# Patient Record
Sex: MALE | Race: WHITE | NOT HISPANIC OR LATINO | Employment: FULL TIME | URBAN - METROPOLITAN AREA
[De-identification: names, ages, dates, MRNs, and addresses within clinical notes are randomized per-mention and may not be internally consistent; named-entity substitution may affect disease eponyms.]

---

## 2022-01-27 ENCOUNTER — OFFICE VISIT (OUTPATIENT)
Dept: FAMILY MEDICINE | Facility: CLINIC | Age: 32
End: 2022-01-27
Payer: OTHER GOVERNMENT

## 2022-01-27 VITALS
HEART RATE: 99 BPM | SYSTOLIC BLOOD PRESSURE: 100 MMHG | DIASTOLIC BLOOD PRESSURE: 60 MMHG | HEIGHT: 69 IN | TEMPERATURE: 100 F | WEIGHT: 156.31 LBS | BODY MASS INDEX: 23.15 KG/M2 | OXYGEN SATURATION: 96 %

## 2022-01-27 DIAGNOSIS — T50.905A DRESS SYNDROME: ICD-10-CM

## 2022-01-27 DIAGNOSIS — E86.0 DEHYDRATION: ICD-10-CM

## 2022-01-27 DIAGNOSIS — R05.9 COUGH: ICD-10-CM

## 2022-01-27 DIAGNOSIS — D72.12 DRESS SYNDROME: ICD-10-CM

## 2022-01-27 DIAGNOSIS — K12.30 MUCOSITIS ORAL: Primary | ICD-10-CM

## 2022-01-27 DIAGNOSIS — I95.9 HYPOTENSION, UNSPECIFIED HYPOTENSION TYPE: ICD-10-CM

## 2022-01-27 DIAGNOSIS — R00.0 TACHYCARDIA: ICD-10-CM

## 2022-01-27 PROBLEM — H17.822 PERIPHERAL OPACITY OF CORNEA, LEFT EYE: Status: ACTIVE | Noted: 2022-01-27

## 2022-01-27 PROBLEM — H52.209 ASTIGMATISM: Status: ACTIVE | Noted: 2022-01-27

## 2022-01-27 PROCEDURE — 99205 OFFICE O/P NEW HI 60 MIN: CPT | Mod: S$PBB,,, | Performed by: FAMILY MEDICINE

## 2022-01-27 PROCEDURE — 99999 PR PBB SHADOW E&M-NEW PATIENT-LVL V: CPT | Mod: PBBFAC,,, | Performed by: FAMILY MEDICINE

## 2022-01-27 PROCEDURE — 99205 OFFICE O/P NEW HI 60 MIN: CPT | Mod: PBBFAC,PO | Performed by: FAMILY MEDICINE

## 2022-01-27 PROCEDURE — 99999 PR PBB SHADOW E&M-NEW PATIENT-LVL V: ICD-10-PCS | Mod: PBBFAC,,, | Performed by: FAMILY MEDICINE

## 2022-01-27 PROCEDURE — 99205 PR OFFICE/OUTPT VISIT, NEW, LEVL V, 60-74 MIN: ICD-10-PCS | Mod: S$PBB,,, | Performed by: FAMILY MEDICINE

## 2022-01-27 RX ORDER — CHOLECALCIFEROL (VITAMIN D3) 25 MCG
1000 TABLET ORAL DAILY
COMMUNITY
End: 2022-10-24

## 2022-01-27 RX ORDER — HYDROCODONE POLISTIREX AND CHLORPHENIRAMINE POLISTIREX 10; 8 MG/5ML; MG/5ML
5 SUSPENSION, EXTENDED RELEASE ORAL EVERY 12 HOURS PRN
Qty: 120 ML | Refills: 0 | Status: SHIPPED | OUTPATIENT
Start: 2022-01-27 | End: 2022-02-17

## 2022-01-27 RX ORDER — SODIUM CHLORIDE 9 MG/ML
INJECTION, SOLUTION INTRAVENOUS
Status: DISCONTINUED | OUTPATIENT
Start: 2022-01-27 | End: 2022-01-27

## 2022-01-27 RX ORDER — SODIUM CHLORIDE 9 MG/ML
INJECTION, SOLUTION INTRAVENOUS ONCE
Status: DISCONTINUED | OUTPATIENT
Start: 2022-01-27 | End: 2022-01-27

## 2022-01-27 RX ORDER — PREDNISOLONE 15 MG/5ML
SOLUTION ORAL
COMMUNITY
Start: 2022-01-22 | End: 2022-02-03 | Stop reason: SDUPTHER

## 2022-01-27 RX ORDER — FAMOTIDINE 20 MG/1
20 TABLET, FILM COATED ORAL 2 TIMES DAILY
COMMUNITY
Start: 2022-01-22 | End: 2022-03-25

## 2022-01-27 NOTE — PROGRESS NOTES
Subjective:       Patient ID: Augustine Collins is a 31 y.o. male.    Chief Complaint: Establish Care (Went to Er on 01/09/22 wants to follow up with care cough ), Cough, Anorexia, and Dizziness    Here today to establish care with a new PCP.  Here today with his Sig. Other (who is an ER nurse).  One Jan 4th he started with a fever. (He is fully COVID vaccinated)  He was seen at Urgent Care on 1/6, tested negative for COVID and was prescribed Zithromax and Steroids.  No improvement. He continued to run fever and had bad headache.  He was recently seen a treated for Acute pharyngitis at the ER (1/9/22).  He tested neg for COVID.  He did not have a strep test.  He was treated with Amoxil.  No improvement. On 1/12 he went to [place] due to continued fever and mild cough.  He was tested for Mono, strep, flu and COVID.  He had a normal CXR.  He tested positive for strep, treated with Bicillin.  Had some improvement for a few hours and on 1/13, he developed a severe cough with post-tussive emesis.  On 1/14, he developed a rash, went back to ER at [place] and he developed jaundice with fever.  He had lab work done which showed elevated liver enzymes (, ), Sodium 128, Temp 104.5.  He had CXR and showed Pneumonia.  He was admitted for dehydration and pneumonia.  He became hypoxic, requiring up to 10 L of O2.  He saw ID.  He was transferred to [facility] and dx with DRESS syndrome from [facility].  His Abx were stopped and he was put on steroids.  He was found to have gallstones.  Bx of rash was consistent with drug reaction.  He was discharged from [facility] on 1/22/22 to home.  He had a f/u with Derm this week and had lab work which showed improvement.  He is c/o severe mouth and throat pain.  He has not been able to eat or drink.  He had magic mouthwash prescribed by his Derm.  It has not helped.    Review of Systems   Constitutional: Positive for fatigue and fever. Negative for appetite change.   HENT: Positive for sore  "throat and trouble swallowing.    Respiratory: Positive for cough. Negative for shortness of breath and wheezing.    Cardiovascular: Negative for chest pain and palpitations.   Gastrointestinal: Negative for constipation, diarrhea and vomiting.   Genitourinary: Negative for difficulty urinating, dysuria, frequency and hematuria.   Neurological: Negative for dizziness, syncope, weakness and headaches.   Psychiatric/Behavioral: Negative for agitation, behavioral problems and confusion.       Objective:      Vitals:    01/27/22 1049 01/27/22 1100 01/27/22 1243 01/27/22 1341   BP: (!) 72/68 (!) 84/62 108/78 100/60   Pulse: (!) 133  103 99   Temp:   (!) 100.6 °F (38.1 °C) 100.3 °F (37.9 °C)   SpO2: 96%  98% 96%   Weight: 70.9 kg (156 lb 4.9 oz)      Height: 5' 9" (1.753 m)         Physical Exam  Constitutional:       Appearance: He is ill-appearing.   HENT:      Mouth/Throat:      Mouth: Mucous membranes are dry. Oral lesions (multiple ulcerations to tongue, soft palate, posterior pharynx) present.      Tongue: Lesions present.      Palate: Lesions present.      Comments: Lips dry and cracked  Cardiovascular:      Rate and Rhythm: Regular rhythm. Tachycardia present.      Pulses: Normal pulses.   Pulmonary:      Effort: Pulmonary effort is normal. No respiratory distress.      Breath sounds: Normal breath sounds. No stridor. No wheezing or rales.   Musculoskeletal:         General: No swelling.   Skin:     General: Skin is warm.      Findings: No rash (desquamation present ).   Neurological:      General: No focal deficit present.      Mental Status: He is alert and oriented to person, place, and time.   Psychiatric:         Mood and Affect: Mood normal.         Behavior: Behavior normal.         Thought Content: Thought content normal.         Assessment:       1. Mucositis oral    2. DRESS syndrome    3. Dehydration    4. Tachycardia    5. Hypotension, unspecified hypotension type    6. Cough        Plan:     "   Mucositis oral  -     Discontinue: 0.9%  NaCl infusion  -     hydrocodone-chlorpheniramine (TUSSIONEX) 10-8 mg/5 mL suspension; Take 5 mLs by mouth every 12 (twelve) hours as needed for Cough.  Dispense: 120 mL; Refill: 0  -     Ambulatory referral/consult to ENT; Future; Expected date: 02/03/2022    DRESS syndrome    Dehydration  -     Discontinue: 0.9%  NaCl infusion  -     Discontinue: 0.9%  NaCl infusion  -     sodium chloride 0.9% bolus 2,000 mL    Tachycardia  -     Discontinue: 0.9%  NaCl infusion  -     sodium chloride 0.9% bolus 2,000 mL    Hypotension, unspecified hypotension type  -     Discontinue: 0.9%  NaCl infusion  -     sodium chloride 0.9% bolus 2,000 mL    Cough  -     hydrocodone-chlorpheniramine (TUSSIONEX) 10-8 mg/5 mL suspension; Take 5 mLs by mouth every 12 (twelve) hours as needed for Cough.  Dispense: 120 mL; Refill: 0    IV Fluids given in the clinic today.  Patient received 2 Liters.  Patient monitored.  Blood pressure improved and HR came down.  Stressed the importance of continued hydration.  Discussed small sips of water, Pedialyte pop, ice chips and using Pain medication to help.  Will start with Tussionex as he was told to avoid tylenol and it will help his cough as well.  If no improvement, Oncology suggested You could try liquid morphine.  application of morphine sulfate (0.2%, 2 mg/mL in water, 15 mL swish for two minutes and expectorate).      Will refer to ENT to see if there are any other options then his Magic Mouthwash to help with his ulcerations.      Medication List with Changes/Refills   New Medications    HYDROCODONE-CHLORPHENIRAMINE (TUSSIONEX) 10-8 MG/5 ML SUSPENSION    Take 5 mLs by mouth every 12 (twelve) hours as needed for Cough.   Current Medications    FAMOTIDINE (PEPCID) 20 MG TABLET    Take 20 mg by mouth 2 (two) times daily.    PREDNISOLONE (PRELONE) 15 MG/5 ML SYRUP    Take by mouth.    VITAMIN D (VITAMIN D3) 1000 UNITS TAB    Take 1,000 Units by mouth once  daily.

## 2022-01-27 NOTE — PROGRESS NOTES
[facility]    Subjective:      Patient: Augustine Collins Patient PCP: Zhang Zhou MD         :  1990     Sex:  male      MRN:  [id number]          Date of Visit: 2022      Chief Complaint: Oral mucositis    Patient ID: Augustine Collins is a 31 y.o. male who was referred by Dr. Zhang Zhou for for mucositis. Pt was seen by [facility] medicine 2022 for anorexia. Pt was noted to have multiple oral ulcers. Pt had been treated at ED 2022 for acute pharyngitis and treated with amoxil. Pt went to [place] 2022 and was tested for mono, strep, flu, and covid19. Pt had normal chest XR. Pt tested positive for strep and was treated with [place]. Pt developed rash on 2022 and went back to the ED at [place] and developed a rash with jaundice and fever. Pt had CXR that showed pneumonia. Pt was transferred to [facility] and dx with DRESS syndrome secondary to PCN. Pt abx were stopped and he was started on steroids. Skin biopsy consistent with drug reaction. Pt was prescribed magic mouthwash, which did not help his mouth soreness. Pt was given 2L NaCl IV fluids for dehydration. Pt was given tussionex suspension q12h for help with mucositis and cough. Pt is seen today in office and continues to take tussionex and is taking prelone syrup once a day and is currently taking an 80mg dose. Pt states his other DRESS symptoms have improved, but he is still having sever oral pain.    Review of Systems   Constitutional: Negative.    HENT: Positive for mouth sores and sore throat.    Eyes: Negative.    Respiratory: Positive for cough.    Cardiovascular: Negative.    Gastrointestinal: Negative.    Endocrine: Negative.    Genitourinary: Negative.    Musculoskeletal: Negative.    Skin: Positive for rash.   Allergic/Immunologic: Negative.    Neurological: Positive for dizziness and light-headedness.   Hematological: Negative.    Psychiatric/Behavioral: Negative.         Past Medical History  He has no past  medical history on file.    Family History  His family history is not on file.    No past surgical history on file.  Social History     Tobacco Use    Smoking status: Never Smoker    Smokeless tobacco: Never Used   Substance and Sexual Activity    Alcohol use: Not on file    Drug use: Not on file    Sexual activity: Not on file     Medications  He has a current medication list which includes the following prescription(s): famotidine, hydrocodone-chlorpheniramine, prednisolone, vitamin d, water Liqd 150 mL with MILK OF MAGNESIA 400 mg/5 mL Susp 400 mg, diphenhydrAMINE 12.5 mg/5 mL Elix 60 mg, nystatin 100,000 unit/mL Susp 500,000 Units, and dexamethasone.    Review of patient's allergies indicates:   Allergen Reactions    Pcn [penicillins]      All medications, allergies, and past history have been reviewed.    Objective:      Vitals:  Vitals - 1 value per visit 1/27/2022 1/28/2022 1/28/2022   SYSTOLIC 100 - -   DIASTOLIC 60 - -   Pulse 99 - -   Temp 100.3 - -   Resp - - -   SPO2 96 - -   Weight (lb) - - 156.09   Weight (kg) - - 70.8   Height - - 69   BMI (Calculated) - - 23   VISIT REPORT - - -   Pain Score  - 10 -       Body surface area is 1.86 meters squared.    Physical Exam  Constitutional:       General: He is not in acute distress.     Appearance: Normal appearance. He is not ill-appearing.   HENT:      Head: Normocephalic and atraumatic.      Right Ear: Tympanic membrane, ear canal and external ear normal.      Left Ear: Tympanic membrane, ear canal and external ear normal.      Nose: Nose normal.      Comments: Dry nasal mucosa bilaterally     Mouth/Throat:      Mouth: Mucous membranes are dry. Oral lesions (numerous lesions involving soft palate and buccal mucosa that apepar to be healing) present.      Pharynx: Uvula midline.      Comments: Lips dry with petroleum based product on it today in office.  Eyes:      General:         Right eye: No discharge.         Left eye: No discharge.       Extraocular Movements: Extraocular movements intact.      Conjunctiva/sclera: Conjunctivae normal.   Pulmonary:      Effort: Pulmonary effort is normal.   Skin:     Findings: Erythema (erythematous and dry) present.   Neurological:      General: No focal deficit present.      Mental Status: He is alert and oriented to person, place, and time. Mental status is at baseline.   Psychiatric:         Mood and Affect: Mood normal.         Behavior: Behavior normal.         Thought Content: Thought content normal.         Judgment: Judgment normal.       Labs:  No results found for: WBC, PLT, CREATININE, TSH, GLU, HGBA1C    All lab results and imaging results have been reviewed.    Assessment:        ICD-10-CM ICD-9-CM   1. DRESS syndrome  D72.12 693.0    T50.905A E947.9     288.3   2. Mouth ulcers  K12.1 528.9              Plan:     Pt advised that oral ulcers appear to be healing and to continue tussionex and prelone syrup as prescribed. Pt advised that he can use magic mouth wash between tussionex to try to get some relief of oral pain. Pt advised that he can swish steroid solution prior to swallowing to help with relief of pain. Pt advised to try to keep hydrated and to do liquid diet for now. He may progress diet as pain alleviates. I would like pt to maintain caloric intake. Pt advised that should he get dehydrated again, he may need IV fluids. I advised pt that supportive measures can be done for now and that he should slowly have relief and healing of oral lesions. I would like pt to follow up with hem/onc Dr. Laurie Huertas to see if there is any further blood workup that should be done for pt. Pt knows his DRESS syndrome is a hypersensitivity reaction that he had to penicillins. Pt will avoid use of penicillins. Pt advised to use nasal saline 4-6 times a day for nasal mucosa dryness. Pt may call with any questions or concerns.

## 2022-01-28 ENCOUNTER — TELEPHONE (OUTPATIENT)
Dept: HEMATOLOGY/ONCOLOGY | Facility: CLINIC | Age: 32
End: 2022-01-28

## 2022-01-28 ENCOUNTER — PATIENT MESSAGE (OUTPATIENT)
Dept: FAMILY MEDICINE | Facility: CLINIC | Age: 32
End: 2022-01-28

## 2022-01-28 ENCOUNTER — OFFICE VISIT (OUTPATIENT)
Dept: OTOLARYNGOLOGY | Facility: CLINIC | Age: 32
End: 2022-01-28
Payer: OTHER GOVERNMENT

## 2022-01-28 VITALS — WEIGHT: 156.06 LBS | BODY MASS INDEX: 23.12 KG/M2 | HEIGHT: 69 IN

## 2022-01-28 DIAGNOSIS — K12.30 MUCOSITIS ORAL: Primary | ICD-10-CM

## 2022-01-28 DIAGNOSIS — D72.12 DRESS SYNDROME: Primary | ICD-10-CM

## 2022-01-28 DIAGNOSIS — T50.905A DRESS SYNDROME: Primary | ICD-10-CM

## 2022-01-28 DIAGNOSIS — K12.1 MOUTH ULCERS: ICD-10-CM

## 2022-01-28 PROCEDURE — 99203 OFFICE O/P NEW LOW 30 MIN: CPT | Mod: S$PBB,,, | Performed by: PHYSICIAN ASSISTANT

## 2022-01-28 PROCEDURE — 99203 PR OFFICE/OUTPT VISIT, NEW, LEVL III, 30-44 MIN: ICD-10-PCS | Mod: S$PBB,,, | Performed by: PHYSICIAN ASSISTANT

## 2022-01-28 PROCEDURE — 99999 PR PBB SHADOW E&M-EST. PATIENT-LVL IV: ICD-10-PCS | Mod: PBBFAC,,, | Performed by: PHYSICIAN ASSISTANT

## 2022-01-28 PROCEDURE — 99214 OFFICE O/P EST MOD 30 MIN: CPT | Mod: PBBFAC,PO | Performed by: PHYSICIAN ASSISTANT

## 2022-01-28 PROCEDURE — 99999 PR PBB SHADOW E&M-EST. PATIENT-LVL IV: CPT | Mod: PBBFAC,,, | Performed by: PHYSICIAN ASSISTANT

## 2022-01-28 RX ORDER — DEXAMETHASONE 0.5 MG/5ML
1 SOLUTION ORAL EVERY 8 HOURS
Qty: 300 ML | Refills: 0 | Status: SHIPPED | OUTPATIENT
Start: 2022-01-28 | End: 2022-02-07

## 2022-01-28 NOTE — TELEPHONE ENCOUNTER
Received referral for dx of DRESS syndrome, mouth ulcers to Dr Huertas.  Pt will need to be seen sooner than next available benign hem appt.  Will clarify scheduling with Dr Ballard.

## 2022-01-28 NOTE — TELEPHONE ENCOUNTER
Returned call to pt.  Spoke with Inez, pt was on speaker phone with her.    Spoke with Dr Huertas and ok to schedule the pt for this Monday.  Confirmed appt date/time/location w/ pt and Ms Wiley.

## 2022-01-28 NOTE — TELEPHONE ENCOUNTER
----- Message from Maritza Evans sent at 1/28/2022 10:46 AM CST -----  Type: Needs Medical Advice  Who Called: Patient   Symptoms (please be specific):    How long has patient had these symptoms:    Pharmacy name and phone #:    Best Call Back Number: [phone]  Additional Information: Patient is requesting a call back to schedule an appt. from referral.

## 2022-01-28 NOTE — PATIENT INSTRUCTIONS
Continue oral intake. Do fluid diet for now. Please make sure to eat and keep well-hydrated.   Continue Tussionex twice daily and prelone syrup as prescribed.  Nasal saline 4-6 times a day to keep nasal mucosa moist.  Symptoms can take some time to slowly resolve. If severely dehydrated, follow up with urgent care or PCP for IV fluids.   Follow up with hematology.

## 2022-01-31 ENCOUNTER — OFFICE VISIT (OUTPATIENT)
Dept: HEMATOLOGY/ONCOLOGY | Facility: CLINIC | Age: 32
End: 2022-01-31
Payer: OTHER GOVERNMENT

## 2022-01-31 ENCOUNTER — LAB VISIT (OUTPATIENT)
Dept: LAB | Facility: HOSPITAL | Age: 32
End: 2022-01-31
Attending: INTERNAL MEDICINE
Payer: OTHER GOVERNMENT

## 2022-01-31 VITALS
DIASTOLIC BLOOD PRESSURE: 78 MMHG | HEART RATE: 106 BPM | TEMPERATURE: 98 F | WEIGHT: 155.44 LBS | OXYGEN SATURATION: 99 % | HEIGHT: 69 IN | SYSTOLIC BLOOD PRESSURE: 106 MMHG | BODY MASS INDEX: 23.02 KG/M2

## 2022-01-31 DIAGNOSIS — D72.12 DRESS SYNDROME: ICD-10-CM

## 2022-01-31 DIAGNOSIS — T50.905A DRESS SYNDROME: ICD-10-CM

## 2022-01-31 DIAGNOSIS — K12.1 MOUTH ULCERS: ICD-10-CM

## 2022-01-31 LAB
ALBUMIN SERPL BCP-MCNC: 3 G/DL (ref 3.5–5.2)
ALP SERPL-CCNC: 96 U/L (ref 55–135)
ALT SERPL W/O P-5'-P-CCNC: 66 U/L (ref 10–44)
ANION GAP SERPL CALC-SCNC: 9 MMOL/L (ref 8–16)
AST SERPL-CCNC: 23 U/L (ref 10–40)
BASOPHILS # BLD AUTO: 0.03 K/UL (ref 0–0.2)
BASOPHILS NFR BLD: 0.2 % (ref 0–1.9)
BILIRUB SERPL-MCNC: 2.2 MG/DL (ref 0.1–1)
BUN SERPL-MCNC: 15 MG/DL (ref 6–20)
CALCIUM SERPL-MCNC: 9.5 MG/DL (ref 8.7–10.5)
CHLORIDE SERPL-SCNC: 96 MMOL/L (ref 95–110)
CO2 SERPL-SCNC: 31 MMOL/L (ref 23–29)
CREAT SERPL-MCNC: 0.8 MG/DL (ref 0.5–1.4)
DIFFERENTIAL METHOD: ABNORMAL
EOSINOPHIL # BLD AUTO: 0 K/UL (ref 0–0.5)
EOSINOPHIL NFR BLD: 0.1 % (ref 0–8)
ERYTHROCYTE [DISTWIDTH] IN BLOOD BY AUTOMATED COUNT: 15 % (ref 11.5–14.5)
EST. GFR  (AFRICAN AMERICAN): >60 ML/MIN/1.73 M^2
EST. GFR  (NON AFRICAN AMERICAN): >60 ML/MIN/1.73 M^2
FERRITIN SERPL-MCNC: 2659 NG/ML (ref 20–300)
GLUCOSE SERPL-MCNC: 120 MG/DL (ref 70–110)
HCT VFR BLD AUTO: 34.8 % (ref 40–54)
HGB BLD-MCNC: 11.3 G/DL (ref 14–18)
IMM GRANULOCYTES # BLD AUTO: 0.16 K/UL (ref 0–0.04)
IMM GRANULOCYTES NFR BLD AUTO: 1.1 % (ref 0–0.5)
LDH SERPL L TO P-CCNC: 176 U/L (ref 110–260)
LYMPHOCYTES # BLD AUTO: 1 K/UL (ref 1–4.8)
LYMPHOCYTES NFR BLD: 6.9 % (ref 18–48)
MCH RBC QN AUTO: 27.7 PG (ref 27–31)
MCHC RBC AUTO-ENTMCNC: 32.5 G/DL (ref 32–36)
MCV RBC AUTO: 85 FL (ref 82–98)
MONOCYTES # BLD AUTO: 0.3 K/UL (ref 0.3–1)
MONOCYTES NFR BLD: 1.9 % (ref 4–15)
NEUTROPHILS # BLD AUTO: 13.6 K/UL (ref 1.8–7.7)
NEUTROPHILS NFR BLD: 89.8 % (ref 38–73)
NRBC BLD-RTO: 0 /100 WBC
PATH REV BLD -IMP: NORMAL
PLATELET # BLD AUTO: 579 K/UL (ref 150–450)
PMV BLD AUTO: 8.1 FL (ref 9.2–12.9)
POTASSIUM SERPL-SCNC: 4.4 MMOL/L (ref 3.5–5.1)
PROT SERPL-MCNC: 7.5 G/DL (ref 6–8.4)
RBC # BLD AUTO: 4.08 M/UL (ref 4.6–6.2)
SODIUM SERPL-SCNC: 136 MMOL/L (ref 136–145)
URATE SERPL-MCNC: 2.7 MG/DL (ref 3.4–7)
WBC # BLD AUTO: 15.11 K/UL (ref 3.9–12.7)

## 2022-01-31 PROCEDURE — 84550 ASSAY OF BLOOD/URIC ACID: CPT | Mod: PN | Performed by: INTERNAL MEDICINE

## 2022-01-31 PROCEDURE — 85025 COMPLETE CBC W/AUTO DIFF WBC: CPT | Mod: PN | Performed by: INTERNAL MEDICINE

## 2022-01-31 PROCEDURE — 82728 ASSAY OF FERRITIN: CPT | Performed by: INTERNAL MEDICINE

## 2022-01-31 PROCEDURE — 85060 BLOOD SMEAR INTERPRETATION: CPT | Mod: ,,, | Performed by: PATHOLOGY

## 2022-01-31 PROCEDURE — 99214 OFFICE O/P EST MOD 30 MIN: CPT | Mod: PBBFAC,PN | Performed by: INTERNAL MEDICINE

## 2022-01-31 PROCEDURE — 36415 COLL VENOUS BLD VENIPUNCTURE: CPT | Mod: PN | Performed by: INTERNAL MEDICINE

## 2022-01-31 PROCEDURE — 99999 PR PBB SHADOW E&M-EST. PATIENT-LVL IV: CPT | Mod: PBBFAC,,, | Performed by: INTERNAL MEDICINE

## 2022-01-31 PROCEDURE — 85060 PATHOLOGIST REVIEW: ICD-10-PCS | Mod: ,,, | Performed by: PATHOLOGY

## 2022-01-31 PROCEDURE — 80053 COMPREHEN METABOLIC PANEL: CPT | Mod: PN | Performed by: INTERNAL MEDICINE

## 2022-01-31 PROCEDURE — 99205 OFFICE O/P NEW HI 60 MIN: CPT | Mod: S$PBB,,, | Performed by: INTERNAL MEDICINE

## 2022-01-31 PROCEDURE — 83615 LACTATE (LD) (LDH) ENZYME: CPT | Mod: PN | Performed by: INTERNAL MEDICINE

## 2022-01-31 PROCEDURE — 99999 PR PBB SHADOW E&M-EST. PATIENT-LVL IV: ICD-10-PCS | Mod: PBBFAC,,, | Performed by: INTERNAL MEDICINE

## 2022-01-31 PROCEDURE — 99205 PR OFFICE/OUTPT VISIT, NEW, LEVL V, 60-74 MIN: ICD-10-PCS | Mod: S$PBB,,, | Performed by: INTERNAL MEDICINE

## 2022-01-31 NOTE — PROGRESS NOTES
PATIENT: Augustine Collins  MRN: [id number]  DATE: 1/31/2022      Diagnosis:   1. DRESS syndrome    2. Mouth ulcers        Chief Complaint: Establish Care (DRESS Syndrome)    Subjective:    Initial History: Mr. Collins is a 31 y.o. male with no significant past medical history presents to [facility] for DRESS syndrome.  The patient began having a sore throat with fever on 01/02/2022.  The patient was seen at urgent care, tested for COVID-19, was negative and then sent home with a Z-Charles.  The patient then states his throat got worse and he presented to [place] emergency room [id number] and was diagnosed with strep throat and started on amoxicillin.  The patient then presented to [place] emergency room on 01/12/2020 to as his symptoms were not improving and was given a shot of Penicillin.  The patient then began to develop a cough, fever and a red rash on his chest.  On 01/14/2020 to the patient became jaundiced and developed fatigue, weakness, and sore throat.  At [facility] the patient was found a right lower lobe pneumonia, sodium 126, elevated liver enzymes.  The patient was started on Unasyn.  He then began developing increased oxygen requirements and worsening rash.  The patient states he was [facility] and underwent a biopsy of the rash on his skin on the abdomen.  Biopsy came back as a drug reaction.  The patient was taken off of antibiotics and started on Solu-Medrol.  The patient currently states he is being treated by Dr. Jovanna Ewing with [facility] for DRESS syndrome.   He is currently on prednisone 80 mg daily and is to be tapered over a course of 10 weeks     Currently the patient endorses mouth sores, sore throat, shortness of breath with exertion, cough, and weight loss of 30 lb in a month.  The patient also endorses occasional palpitations.  The patient denies CP, abdominal pain, N/V, constipation, diarrhea.  The patient denies fever, chills,  night sweats, new lumps or bumps, easy bruising or bleeding.    Past Medical History: History reviewed. No pertinent past medical history.    Past Surgical HIstory:   Past Surgical History:   Procedure Laterality Date    LASIK Bilateral        Family History: History reviewed. No pertinent family history.    Social History:  reports that he has never smoked. He has never used smokeless tobacco. He reports current alcohol use.    Allergies:  Review of patient's allergies indicates:   Allergen Reactions    Pcn [penicillins]        Medications:  Current Outpatient Medications   Medication Sig Dispense Refill    famotidine (PEPCID) 20 MG tablet Take 20 mg by mouth 2 (two) times daily.      hydrocodone-chlorpheniramine (TUSSIONEX) 10-8 mg/5 mL suspension Take 5 mLs by mouth every 12 (twelve) hours as needed for Cough. 120 mL 0    prednisoLONE (PRELONE) 15 mg/5 mL syrup Take by mouth.      vitamin D (VITAMIN D3) 1000 units Tab Take 1,000 Units by mouth once daily.      water Liqd 150 mL with MILK OF MAGNESIA 400 mg/5 mL Susp 400 mg, diphenhydrAMINE 12.5 mg/5 mL Elix 60 mg, nystatin 100,000 unit/mL Susp 500,000 Units SHAKE WELL. REFRIDGERATE. SWISH AND SPIT 5 ML EVERY 4 TO 6 HOURS OR PRIOR TO MEALS      dexAMETHasone 0.5 mg/5 mL Soln Take 10 mLs (1 mg total) by mouth every 8 (eight) hours. Swish for 2 minutes and spit out for 10 days 300 mL 0     No current facility-administered medications for this visit.       Review of Systems   Constitutional: Positive for unexpected weight change (30 pounds in a month). Negative for appetite change, chills, diaphoresis and fever.   HENT: Positive for mouth sores and sore throat. Negative for trouble swallowing.    Eyes: Negative for photophobia and visual disturbance.   Respiratory: Positive for cough (productive) and shortness of breath (with exertion). Negative for chest tightness.    Cardiovascular: Positive for palpitations (occasionally with activity). Negative for chest  "pain and leg swelling.   Gastrointestinal: Negative for abdominal pain, constipation, diarrhea, nausea and vomiting.   Endocrine: Negative for cold intolerance and heat intolerance.   Genitourinary: Negative for difficulty urinating, dysuria and hematuria.   Musculoskeletal: Negative for arthralgias, back pain and myalgias.   Skin: Negative for color change and rash.        Dry skin   Neurological: Negative for dizziness, light-headedness, numbness and headaches.   Hematological: Negative for adenopathy. Does not bruise/bleed easily.       ECOG Performance Status: 1   Objective:      Vitals:   Vitals:    01/31/22 1456   BP: 106/78   BP Location: Left arm   Patient Position: Sitting   BP Method: Medium (Manual)   Pulse: 106   Temp: 97.7 °F (36.5 °C)   TempSrc: Temporal   SpO2: 99%   Weight: 70.5 kg (155 lb 6.8 oz)   Height: 5' 9" (1.753 m)       Physical Exam  Constitutional:       General: He is not in acute distress.     Appearance: He is well-developed. He is not diaphoretic.   HENT:      Head: Normocephalic and atraumatic.      Mouth/Throat:      Comments: Ulcers on the lip and oral mucosa  Eyes:      General: No scleral icterus.        Right eye: No discharge.         Left eye: No discharge.   Cardiovascular:      Rate and Rhythm: Normal rate and regular rhythm.      Heart sounds: Normal heart sounds. No murmur heard.  No friction rub. No gallop.    Pulmonary:      Effort: Pulmonary effort is normal. No respiratory distress.      Breath sounds: Normal breath sounds. No wheezing or rales.   Chest:      Chest wall: No tenderness.   Breasts:      Right: No axillary adenopathy or supraclavicular adenopathy.      Left: No axillary adenopathy or supraclavicular adenopathy.       Abdominal:      General: Bowel sounds are normal. There is no distension.      Palpations: Abdomen is soft. There is no mass.      Tenderness: There is no abdominal tenderness. There is no rebound.   Musculoskeletal:         General: No " tenderness. Normal range of motion.   Lymphadenopathy:      Cervical: No cervical adenopathy.      Upper Body:      Right upper body: No supraclavicular or axillary adenopathy.      Left upper body: No supraclavicular or axillary adenopathy.   Skin:     General: Skin is warm and dry.      Findings: No erythema or rash.   Neurological:      Mental Status: He is alert and oriented to person, place, and time.      Coordination: Coordination normal.   Psychiatric:         Behavior: Behavior normal.         Laboratory Data:  Lab Visit on 01/31/2022   Component Date Value Ref Range Status    WBC 01/31/2022 15.11* 3.90 - 12.70 K/uL Final    RBC 01/31/2022 4.08* 4.60 - 6.20 M/uL Final    Hemoglobin 01/31/2022 11.3* 14.0 - 18.0 g/dL Final    Hematocrit 01/31/2022 34.8* 40.0 - 54.0 % Final    MCV 01/31/2022 85  82 - 98 fL Final    MCH 01/31/2022 27.7  27.0 - 31.0 pg Final    MCHC 01/31/2022 32.5  32.0 - 36.0 g/dL Final    RDW 01/31/2022 15.0* 11.5 - 14.5 % Final    Platelets 01/31/2022 579* 150 - 450 K/uL Final    MPV 01/31/2022 8.1* 9.2 - 12.9 fL Final    Immature Granulocytes 01/31/2022 1.1* 0.0 - 0.5 % Final    Gran # (ANC) 01/31/2022 13.6* 1.8 - 7.7 K/uL Final    Immature Grans (Abs) 01/31/2022 0.16* 0.00 - 0.04 K/uL Final    Comment: Mild elevation in immature granulocytes is non specific and   can be seen in a variety of conditions including stress response,   acute inflammation, trauma and pregnancy. Correlation with other   laboratory and clinical findings is essential.      Lymph # 01/31/2022 1.0  1.0 - 4.8 K/uL Final    Mono # 01/31/2022 0.3  0.3 - 1.0 K/uL Final    Eos # 01/31/2022 0.0  0.0 - 0.5 K/uL Final    Baso # 01/31/2022 0.03  0.00 - 0.20 K/uL Final    nRBC 01/31/2022 0  0 /100 WBC Final    Gran % 01/31/2022 89.8* 38.0 - 73.0 % Final    Lymph % 01/31/2022 6.9* 18.0 - 48.0 % Final    Mono % 01/31/2022 1.9* 4.0 - 15.0 % Final    Eosinophil % 01/31/2022 0.1  0.0 - 8.0 % Final    Basophil  % 01/31/2022 0.2  0.0 - 1.9 % Final    Differential Method 01/31/2022 Automated   Final    Sodium 01/31/2022 136  136 - 145 mmol/L Final    Potassium 01/31/2022 4.4  3.5 - 5.1 mmol/L Final    Chloride 01/31/2022 96  95 - 110 mmol/L Final    CO2 01/31/2022 31* 23 - 29 mmol/L Final    Glucose 01/31/2022 120* 70 - 110 mg/dL Final    BUN 01/31/2022 15  6 - 20 mg/dL Final    Creatinine 01/31/2022 0.8  0.5 - 1.4 mg/dL Final    Calcium 01/31/2022 9.5  8.7 - 10.5 mg/dL Final    Total Protein 01/31/2022 7.5  6.0 - 8.4 g/dL Final    Albumin 01/31/2022 3.0* 3.5 - 5.2 g/dL Final    Total Bilirubin 01/31/2022 2.2* 0.1 - 1.0 mg/dL Final    Comment: For infants and newborns, interpretation of results should be based  on gestational age, weight and in agreement with clinical  observations.    Premature Infant recommended reference ranges:  Up to 24 hours.............<8.0 mg/dL  Up to 48 hours............<12.0 mg/dL  3-5 days..................<15.0 mg/dL  6-29 days.................<15.0 mg/dL      Alkaline Phosphatase 01/31/2022 96  55 - 135 U/L Final    AST 01/31/2022 23  10 - 40 U/L Final    ALT 01/31/2022 66* 10 - 44 U/L Final    Anion Gap 01/31/2022 9  8 - 16 mmol/L Final    eGFR if African American 01/31/2022 >60  >60 mL/min/1.73 m^2 Final    eGFR if non African American 01/31/2022 >60  >60 mL/min/1.73 m^2 Final    Comment: Calculation used to obtain the estimated glomerular filtration  rate (eGFR) is the CKD-EPI equation.       Uric Acid 01/31/2022 2.7* 3.4 - 7.0 mg/dL Final    LD 01/31/2022 176  110 - 260 U/L Final    Results are increased in hemolyzed samples.         Imaging: Reviewed    Assessment:       1. DRESS syndrome    2. Mouth ulcers           Plan:     DRESS Syndrome - The patient has DRESS syndrome which he developed from PCN based antibiotics used to treat Strep throat  -The patient is currently on prednisone 80mg and is being tapered over a course of 10 weeks by Dr Lisa Ewing with  Dermatology at [facility]  -Will check blood counts today   -Records from recent [facility] hospitalization requested    Mouth Ulcers - The patient was recently prescribed oral steroids by Dr Zhou for oral ulcers from DRESS  -Pt previously taking magic mouth wash without relief  -Will monitor     Follow Up - CBC, CMP, Ferritin, LDH and Uric acid with follow up based on results.    James Huertas MD  [facility]  Hematology and Oncology  [facility]   [address] [facility]   [place] [address]   O: [phone]  F: [phone]

## 2022-01-31 NOTE — Clinical Note
The patient will need a CBC, pathologist interpretation of the differential, CMP, LDH, Uric acid and ferritin today.  His follow up will be based on the results.

## 2022-02-01 ENCOUNTER — TELEPHONE (OUTPATIENT)
Dept: HEMATOLOGY/ONCOLOGY | Facility: CLINIC | Age: 32
End: 2022-02-01
Payer: OTHER GOVERNMENT

## 2022-02-01 DIAGNOSIS — T50.905A DRESS SYNDROME: Primary | ICD-10-CM

## 2022-02-01 DIAGNOSIS — D72.12 DRESS SYNDROME: Primary | ICD-10-CM

## 2022-02-01 NOTE — TELEPHONE ENCOUNTER
Called and spoke with pt. Pt is scheduled for 1 month follow up with labs. Pt voiced understanding of time, date, and location.

## 2022-02-01 NOTE — TELEPHONE ENCOUNTER
I called the patient and instructed him taht his labs appear to be improving from previous levels discussed in the clinic.  I informed him that I would repeat labs in a month and have him see me at that time.  The patient expressed understanding.  All questions were answered to his satisfaction.    James Huertas MD  [facility]  Hematology and Oncology  [facility]   [address]   [place] [address]   O: [phone]  F: [phone]

## 2022-02-02 LAB — PATH REV BLD -IMP: NORMAL

## 2022-02-03 ENCOUNTER — OFFICE VISIT (OUTPATIENT)
Dept: FAMILY MEDICINE | Facility: CLINIC | Age: 32
End: 2022-02-03
Payer: OTHER GOVERNMENT

## 2022-02-03 VITALS
OXYGEN SATURATION: 97 % | SYSTOLIC BLOOD PRESSURE: 108 MMHG | HEIGHT: 69 IN | HEART RATE: 95 BPM | BODY MASS INDEX: 23.51 KG/M2 | DIASTOLIC BLOOD PRESSURE: 60 MMHG | WEIGHT: 158.75 LBS

## 2022-02-03 DIAGNOSIS — D72.12 DRESS SYNDROME: Primary | ICD-10-CM

## 2022-02-03 DIAGNOSIS — R05.9 COUGH: ICD-10-CM

## 2022-02-03 DIAGNOSIS — T50.905A DRESS SYNDROME: Primary | ICD-10-CM

## 2022-02-03 PROCEDURE — 99214 PR OFFICE/OUTPT VISIT, EST, LEVL IV, 30-39 MIN: ICD-10-PCS | Mod: S$PBB,,, | Performed by: FAMILY MEDICINE

## 2022-02-03 PROCEDURE — 99214 OFFICE O/P EST MOD 30 MIN: CPT | Mod: PBBFAC,PO | Performed by: FAMILY MEDICINE

## 2022-02-03 PROCEDURE — 99214 OFFICE O/P EST MOD 30 MIN: CPT | Mod: S$PBB,,, | Performed by: FAMILY MEDICINE

## 2022-02-03 PROCEDURE — 99999 PR PBB SHADOW E&M-EST. PATIENT-LVL IV: CPT | Mod: PBBFAC,,, | Performed by: FAMILY MEDICINE

## 2022-02-03 PROCEDURE — 99999 PR PBB SHADOW E&M-EST. PATIENT-LVL IV: ICD-10-PCS | Mod: PBBFAC,,, | Performed by: FAMILY MEDICINE

## 2022-02-03 RX ORDER — FLUTICASONE PROPIONATE AND SALMETEROL 100; 50 UG/1; UG/1
1 POWDER RESPIRATORY (INHALATION) 2 TIMES DAILY
Qty: 60 EACH | Refills: 0 | Status: SHIPPED | OUTPATIENT
Start: 2022-02-03 | End: 2022-02-17

## 2022-02-03 RX ORDER — BENZONATATE 200 MG/1
200 CAPSULE ORAL 3 TIMES DAILY PRN
Qty: 60 CAPSULE | Refills: 0 | Status: SHIPPED | OUTPATIENT
Start: 2022-02-03 | End: 2022-02-23

## 2022-02-03 RX ORDER — PREDNISOLONE 15 MG/5ML
60 SOLUTION ORAL DAILY
Qty: 480 ML | Refills: 0 | Status: SHIPPED | OUTPATIENT
Start: 2022-02-03 | End: 2022-03-07 | Stop reason: SDUPTHER

## 2022-02-03 NOTE — LETTER
[facility]  [address]  [facility] [address]  Phone: [phone]  Fax: [phone] February 3, 2022     Patient: Augustine Collins   YOB: 1990   Date of Visit: 2/3/2022       To Whom It May Concern:    I had the pleasure of seeing Mr. Augustine Collins in the office today for follow up on his recent DRESS syndrome.  At this time, he is unable to return to work.  We will plan on seeing him back in about a month to evaluate him.    If you have any questions or concerns, please don't hesitate to contact my office.    Sincerely,        Zhang Zhou MD

## 2022-02-03 NOTE — PROGRESS NOTES
"Subjective:       Patient ID: Augustine Collins is a 31 y.o. male.    Chief Complaint: Follow-up    Here today to f/u on his DRESS syndrome.  Since his last visit, he has seen ENT and Heme/Onc.  Overall, he is slowly improving.  He continues to deal with oral ulcers, but is able to drink and eat some.  He is using Ensure and is getting enough hydration.  He continues to have a cough, not helped by cough medication.  He is on a long taper of the prednisolone.  They have the taper at home, but the size of steroid they received is not enough.    Review of Systems   Constitutional: Negative for appetite change.   HENT: Negative for congestion, sneezing and sore throat.    Respiratory: Positive for cough. Negative for shortness of breath and wheezing.    Cardiovascular: Negative for chest pain and palpitations.   Gastrointestinal: Negative for abdominal pain, constipation, diarrhea, nausea and vomiting.   Genitourinary: Negative for difficulty urinating, dysuria, frequency and hematuria.   Neurological: Negative for dizziness, syncope, weakness and headaches.   Psychiatric/Behavioral: Negative for agitation, behavioral problems and confusion. The patient is not nervous/anxious.        Objective:      Vitals:    02/03/22 0917   BP: 108/60   BP Location: Right arm   Patient Position: Sitting   BP Method: Large (Manual)   Pulse: 95   SpO2: 97%   Weight: 72 kg (158 lb 11.7 oz)   Height: 5' 9" (1.753 m)      Physical Exam  Constitutional:       General: He is not in acute distress.     Appearance: Normal appearance. He is not ill-appearing.   HENT:      Mouth/Throat:      Mouth: Oral lesions (much improved oral ulcerations) present.   Cardiovascular:      Rate and Rhythm: Normal rate and regular rhythm.      Heart sounds: No murmur heard.      Pulmonary:      Effort: No respiratory distress.      Breath sounds: No wheezing.   Neurological:      Mental Status: He is alert.   Psychiatric:         Mood and Affect: Mood normal.    "      Behavior: Behavior normal.         Thought Content: Thought content normal.         Assessment:       1. DRESS syndrome    2. Cough        Plan:       DRESS syndrome  -     prednisoLONE (PRELONE) 15 mg/5 mL syrup; Take 20 mLs (60 mg total) by mouth once daily.  Dispense: 480 mL; Refill: 0    Cough  -     fluticasone-salmeterol diskus inhaler 100-50 mcg; Inhale 1 puff into the lungs 2 (two) times daily. Controller  Dispense: 60 each; Refill: 0  -     benzonatate (TESSALON) 200 MG capsule; Take 1 capsule (200 mg total) by mouth 3 (three) times daily as needed for Cough.  Dispense: 60 capsule; Refill: 0  -     X-Ray Chest PA And Lateral; Future; Expected date: 02/03/2022    X-Ray in next few days if no improvement.  Otherwise, add tessalon to Tussionex.  Trial Advair.  Letter for work today.  Plan on his return to work when he is fully recovered, maybe 2-4 weeks.      Medication List with Changes/Refills   New Medications    BENZONATATE (TESSALON) 200 MG CAPSULE    Take 1 capsule (200 mg total) by mouth 3 (three) times daily as needed for Cough.    FLUTICASONE-SALMETEROL DISKUS INHALER 100-50 MCG    Inhale 1 puff into the lungs 2 (two) times daily. Controller   Current Medications    DEXAMETHASONE 0.5 MG/5 ML SOLN    Take 10 mLs (1 mg total) by mouth every 8 (eight) hours. Swish for 2 minutes and spit out for 10 days    FAMOTIDINE (PEPCID) 20 MG TABLET    Take 20 mg by mouth 2 (two) times daily.    HYDROCODONE-CHLORPHENIRAMINE (TUSSIONEX) 10-8 MG/5 ML SUSPENSION    Take 5 mLs by mouth every 12 (twelve) hours as needed for Cough.    VITAMIN D (VITAMIN D3) 1000 UNITS TAB    Take 1,000 Units by mouth once daily.    WATER LIQD 150 ML WITH MILK OF MAGNESIA 400 MG/5 ML SUSP 400 MG, DIPHENHYDRAMINE 12.5 MG/5 ML ELIX 60 MG, NYSTATIN 100,000 UNIT/ML SUSP 500,000 UNITS    SHAKE WELL. REFRIDGERATE. SWISH AND SPIT 5 ML EVERY 4 TO 6 HOURS OR PRIOR TO MEALS   Changed and/or Refilled Medications    Modified Medication Previous  Medication    PREDNISOLONE (PRELONE) 15 MG/5 ML SYRUP prednisoLONE (PRELONE) 15 mg/5 mL syrup       Take 20 mLs (60 mg total) by mouth once daily.    Take by mouth.

## 2022-02-08 ENCOUNTER — TELEPHONE (OUTPATIENT)
Dept: FAMILY MEDICINE | Facility: CLINIC | Age: 32
End: 2022-02-08
Payer: OTHER GOVERNMENT

## 2022-02-08 ENCOUNTER — DOCUMENTATION ONLY (OUTPATIENT)
Dept: FAMILY MEDICINE | Facility: CLINIC | Age: 32
End: 2022-02-08
Payer: OTHER GOVERNMENT

## 2022-02-08 ENCOUNTER — HOSPITAL ENCOUNTER (OUTPATIENT)
Dept: RADIOLOGY | Facility: HOSPITAL | Age: 32
Discharge: HOME | End: 2022-02-08
Attending: FAMILY MEDICINE
Payer: OTHER GOVERNMENT

## 2022-02-08 DIAGNOSIS — R05.9 COUGH: ICD-10-CM

## 2022-02-08 PROCEDURE — 71046 X-RAY EXAM CHEST 2 VIEWS: CPT | Mod: TC,FY,PO

## 2022-02-08 PROCEDURE — 71046 X-RAY EXAM CHEST 2 VIEWS: CPT | Mod: 26,,, | Performed by: RADIOLOGY

## 2022-02-08 PROCEDURE — 71046 XR CHEST PA AND LATERAL: ICD-10-PCS | Mod: 26,,, | Performed by: RADIOLOGY

## 2022-02-08 NOTE — PROGRESS NOTES
Fluticasone-Salmeterol diskus inhaler has been denied by patients pharmacy benefit plan as excluded from plan.  Possible alternatives:  Albuterol Sulfate HFAA  Advair -21 mcg/act   Flovent -62.5-25 mcg/act  Trelegy Ellipta 100-62.5 mcgdsdv  Anoro Ellipta  Breztri Aerosphere  Serevent Diskus

## 2022-02-17 ENCOUNTER — OFFICE VISIT (OUTPATIENT)
Dept: FAMILY MEDICINE | Facility: CLINIC | Age: 32
End: 2022-02-17
Payer: OTHER GOVERNMENT

## 2022-02-17 VITALS
OXYGEN SATURATION: 99 % | DIASTOLIC BLOOD PRESSURE: 64 MMHG | HEIGHT: 69 IN | BODY MASS INDEX: 23.99 KG/M2 | HEART RATE: 80 BPM | WEIGHT: 162 LBS | SYSTOLIC BLOOD PRESSURE: 122 MMHG

## 2022-02-17 DIAGNOSIS — S16.1XXA ACUTE STRAIN OF NECK MUSCLE, INITIAL ENCOUNTER: Primary | ICD-10-CM

## 2022-02-17 PROBLEM — R05.9 COUGH: Status: RESOLVED | Noted: 2022-02-03 | Resolved: 2022-02-17

## 2022-02-17 PROBLEM — K12.30 MUCOSITIS ORAL: Status: RESOLVED | Noted: 2022-01-27 | Resolved: 2022-02-17

## 2022-02-17 PROCEDURE — 99214 OFFICE O/P EST MOD 30 MIN: CPT | Mod: S$PBB,,, | Performed by: FAMILY MEDICINE

## 2022-02-17 PROCEDURE — 99999 PR PBB SHADOW E&M-EST. PATIENT-LVL IV: ICD-10-PCS | Mod: PBBFAC,,, | Performed by: FAMILY MEDICINE

## 2022-02-17 PROCEDURE — 99999 PR PBB SHADOW E&M-EST. PATIENT-LVL IV: CPT | Mod: PBBFAC,,, | Performed by: FAMILY MEDICINE

## 2022-02-17 PROCEDURE — 99214 PR OFFICE/OUTPT VISIT, EST, LEVL IV, 30-39 MIN: ICD-10-PCS | Mod: S$PBB,,, | Performed by: FAMILY MEDICINE

## 2022-02-17 PROCEDURE — 99214 OFFICE O/P EST MOD 30 MIN: CPT | Mod: PBBFAC,PO | Performed by: FAMILY MEDICINE

## 2022-02-17 RX ORDER — NAPROXEN 500 MG/1
500 TABLET ORAL 2 TIMES DAILY
Qty: 60 TABLET | Refills: 0 | Status: SHIPPED | OUTPATIENT
Start: 2022-02-17 | End: 2022-03-19

## 2022-02-17 RX ORDER — METAXALONE 800 MG/1
800 TABLET ORAL 3 TIMES DAILY
Qty: 30 TABLET | Refills: 0 | Status: SHIPPED | OUTPATIENT
Start: 2022-02-17 | End: 2022-02-27

## 2022-02-17 NOTE — PATIENT INSTRUCTIONS
Patient Education       Neck Pain Exercises   About this topic   The neck or cervical spine has 7 spinal bones that run from the base of your skull to the upper back. These spinal bones have discs in between them. Discs act as shock absorbers. Ligaments are strong bands of tissue that hold the bones together. Many muscles surround and attach on these bones. Nerves come off of the spinal cord and exit out of small spaces in between the spinal bones. You can have neck pain if any of these are injured or damaged. Exercises may help to make this problem better.  General   Before starting with a program, ask your doctor if you are healthy enough to do these exercises. Your doctor may have you work with a  or physical therapist to make a safe exercise program to meet your needs. You should not do the exercises if they cause sharp pains, if you feel dizzy, or if you have vision changes.  Stretching Exercises   Stretching exercises keep your muscles flexible. They also stop them from getting tight. Start by doing each of these stretches 2 to 3 times. In order for your body to make changes, you will need to hold these stretches for 20 to 30 seconds. Try to do the stretches 2 to 3 times each day. Do all exercises slowly.  · Passive neck stretches:  ? Put your left hand on top of your head. Your other arm can be at your side or behind your back. Pull your head toward your left shoulder until you feel a gentle stretch on the right side of your neck. Repeat on the other side using your other hand.  ? Also, try this stretch by pulling in a diagonal direction. With your left hand on top of your head, pull your head down towards the direction of your left knee. You should feel this stretch toward the back on the right side of your neck. Repeat on the other side.  · Active neck stretches:  ? Neck front-to-back motion ? Look down to the floor until you feel a stretch in the back of your neck. Hold. Next, look up to the ceiling  until you feel a stretch in the front of your neck. Hold.  ? Neck side-to-side motion ? Tilt your head to the side and bring your ear to your shoulder until you feel a stretch on the other side of your neck. Hold. Next, tilt your head to the other side until you feel a stretch. Hold.  ? Neck turning ? Turn only your head and look over your left shoulder until you feel stretching in the right side of your neck. Hold. Now turn only your head and look over your right shoulder until you feel a stretch in the left side of your neck. Hold.  · Scalene stretches ? Grasp your head with the hand opposite the side you want to stretch. Pull your head to the side until you feel a stretch. Now, slowly turn your head so your chin is pointed upwards.  · Chin tucks ? Stand straight or lie down on your back. Tuck your chin in and lengthen the back of your neck. Return to the starting position and repeat. It may help to stand up against a wall during this exercise. Try gently pushing your chin with two fingers while trying to flatten your neck against the wall. If you do this exercise lying down, try using a small rolled up washcloth under your neck. Push down into the washcloth when tucking in your chin.  Strengthening Exercises   Strengthening exercises keep your muscles firm and strong. Start by repeating each exercise 2 to 3 times. Work up to doing each exercise 10 times. Try to do the exercises 2 to 3 times each day. Hold each exercise for 3 to 5 seconds. Do all exercises slowly.  · Shoulder blade squeezes ? Pinch your shoulder blades together on your upper back and hold 3 to 5 seconds. Relax.             What will the results be?   · Less pain and stiffness  · Better range of motion  · Increased strength  · Help you heal faster after an injury or surgery  · Increase blood flow to a body part  · Help you feel better and more relaxed  · Give you more energy  · More toned looking muscles  · Better posture  · Easier to do daily  activities  Helpful tips   · Stay active and work out to keep your muscles strong and flexible.  · Be sure you do not hold your breath when exercising. This can raise your blood pressure. If you tend to hold your breath, try counting out loud when exercising. If any exercise bothers you, stop right away.  · Try swinging your arms at an easy pace for a few minutes to warm up your muscles. Do this again after exercising.  · Doing exercises before a meal may be a good way to get into a routine.  · Exercise may be slightly uncomfortable, but you should not have sharp pains. If you do get sharp pains, stop what you are doing. If the sharp pains continue, call your doctor.  Where can I learn more?   American Academy of Orthopa[employer]geons  https://orthoinfo.aaos.org/en/recovery/spine-conditioning-program   American Physical Therapy Association  https://www.choosept.com/symptomsconditionsdetail/physical-therapy-guide-to-neck-pain-57   Last Reviewed Date   2020-03-10  Consumer Information Use and Disclaimer   This information is not specific medical advice and does not replace information you receive from your health care provider. This is only a brief summary of general information. It does NOT include all information about conditions, illnesses, injuries, tests, procedures, treatments, therapies, discharge instructions or life-style choices that may apply to you. You must talk with your health care provider for complete information about your health and treatment options. This information should not be used to decide whether or not to accept your health care providers advice, instructions or recommendations. Only your health care provider has the knowledge and training to provide advice that is right for you.  Copyright   Copyright © 2021 UpToDate, Inc. and its affiliates and/or licensors. All rights reserved.
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
## 2022-03-07 ENCOUNTER — OFFICE VISIT (OUTPATIENT)
Dept: HEMATOLOGY/ONCOLOGY | Facility: CLINIC | Age: 32
End: 2022-03-07
Payer: OTHER GOVERNMENT

## 2022-03-07 ENCOUNTER — LAB VISIT (OUTPATIENT)
Dept: LAB | Facility: HOSPITAL | Age: 32
End: 2022-03-07
Attending: INTERNAL MEDICINE
Payer: OTHER GOVERNMENT

## 2022-03-07 ENCOUNTER — OFFICE VISIT (OUTPATIENT)
Dept: FAMILY MEDICINE | Facility: CLINIC | Age: 32
End: 2022-03-07
Payer: OTHER GOVERNMENT

## 2022-03-07 VITALS
OXYGEN SATURATION: 98 % | HEART RATE: 87 BPM | WEIGHT: 169.75 LBS | DIASTOLIC BLOOD PRESSURE: 80 MMHG | BODY MASS INDEX: 25.14 KG/M2 | HEIGHT: 69 IN | SYSTOLIC BLOOD PRESSURE: 122 MMHG

## 2022-03-07 VITALS
DIASTOLIC BLOOD PRESSURE: 93 MMHG | SYSTOLIC BLOOD PRESSURE: 138 MMHG | HEART RATE: 96 BPM | OXYGEN SATURATION: 96 % | HEIGHT: 69 IN | RESPIRATION RATE: 16 BRPM | BODY MASS INDEX: 25.18 KG/M2 | WEIGHT: 170 LBS

## 2022-03-07 DIAGNOSIS — T50.905A DRESS SYNDROME: ICD-10-CM

## 2022-03-07 DIAGNOSIS — D72.12 DRESS SYNDROME: Primary | ICD-10-CM

## 2022-03-07 DIAGNOSIS — D72.12 DRESS SYNDROME: ICD-10-CM

## 2022-03-07 DIAGNOSIS — K12.1 MOUTH ULCERS: ICD-10-CM

## 2022-03-07 DIAGNOSIS — D63.8 ANEMIA OF CHRONIC DISEASE: ICD-10-CM

## 2022-03-07 DIAGNOSIS — T50.905A DRESS SYNDROME: Primary | ICD-10-CM

## 2022-03-07 LAB
ALBUMIN SERPL BCP-MCNC: 4.1 G/DL (ref 3.5–5.2)
ALP SERPL-CCNC: 48 U/L (ref 55–135)
ALT SERPL W/O P-5'-P-CCNC: 18 U/L (ref 10–44)
ANION GAP SERPL CALC-SCNC: 12 MMOL/L (ref 8–16)
AST SERPL-CCNC: 11 U/L (ref 10–40)
BASOPHILS # BLD AUTO: 0.06 K/UL (ref 0–0.2)
BASOPHILS NFR BLD: 0.5 % (ref 0–1.9)
BILIRUB SERPL-MCNC: 0.6 MG/DL (ref 0.1–1)
BUN SERPL-MCNC: 21 MG/DL (ref 6–20)
CALCIUM SERPL-MCNC: 9.9 MG/DL (ref 8.7–10.5)
CHLORIDE SERPL-SCNC: 103 MMOL/L (ref 95–110)
CO2 SERPL-SCNC: 28 MMOL/L (ref 23–29)
CREAT SERPL-MCNC: 1 MG/DL (ref 0.5–1.4)
DIFFERENTIAL METHOD: ABNORMAL
EOSINOPHIL # BLD AUTO: 0.1 K/UL (ref 0–0.5)
EOSINOPHIL NFR BLD: 1.1 % (ref 0–8)
ERYTHROCYTE [DISTWIDTH] IN BLOOD BY AUTOMATED COUNT: 13.4 % (ref 11.5–14.5)
EST. GFR  (AFRICAN AMERICAN): >60 ML/MIN/1.73 M^2
EST. GFR  (NON AFRICAN AMERICAN): >60 ML/MIN/1.73 M^2
FERRITIN SERPL-MCNC: 257 NG/ML (ref 20–300)
GLUCOSE SERPL-MCNC: 103 MG/DL (ref 70–110)
HCT VFR BLD AUTO: 40.1 % (ref 40–54)
HGB BLD-MCNC: 13.6 G/DL (ref 14–18)
IMM GRANULOCYTES # BLD AUTO: 0.08 K/UL (ref 0–0.04)
IMM GRANULOCYTES NFR BLD AUTO: 0.7 % (ref 0–0.5)
IRON SERPL-MCNC: 99 UG/DL (ref 45–160)
LDH SERPL L TO P-CCNC: 120 U/L (ref 110–260)
LYMPHOCYTES # BLD AUTO: 4 K/UL (ref 1–4.8)
LYMPHOCYTES NFR BLD: 36.1 % (ref 18–48)
MCH RBC QN AUTO: 29.4 PG (ref 27–31)
MCHC RBC AUTO-ENTMCNC: 33.9 G/DL (ref 32–36)
MCV RBC AUTO: 87 FL (ref 82–98)
MONOCYTES # BLD AUTO: 0.9 K/UL (ref 0.3–1)
MONOCYTES NFR BLD: 7.9 % (ref 4–15)
NEUTROPHILS # BLD AUTO: 5.9 K/UL (ref 1.8–7.7)
NEUTROPHILS NFR BLD: 53.7 % (ref 38–73)
NRBC BLD-RTO: 0 /100 WBC
PLATELET # BLD AUTO: 286 K/UL (ref 150–450)
PMV BLD AUTO: 8.7 FL (ref 9.2–12.9)
POTASSIUM SERPL-SCNC: 4.2 MMOL/L (ref 3.5–5.1)
PROT SERPL-MCNC: 7.4 G/DL (ref 6–8.4)
RBC # BLD AUTO: 4.63 M/UL (ref 4.6–6.2)
SATURATED IRON: 25 % (ref 20–50)
SODIUM SERPL-SCNC: 143 MMOL/L (ref 136–145)
TOTAL IRON BINDING CAPACITY: 397 UG/DL (ref 250–450)
TRANSFERRIN SERPL-MCNC: 268 MG/DL (ref 200–375)
URATE SERPL-MCNC: 6.2 MG/DL (ref 3.4–7)
WBC # BLD AUTO: 10.93 K/UL (ref 3.9–12.7)

## 2022-03-07 PROCEDURE — 99999 PR PBB SHADOW E&M-EST. PATIENT-LVL III: CPT | Mod: PBBFAC,,, | Performed by: FAMILY MEDICINE

## 2022-03-07 PROCEDURE — 82728 ASSAY OF FERRITIN: CPT | Performed by: INTERNAL MEDICINE

## 2022-03-07 PROCEDURE — 99213 PR OFFICE/OUTPT VISIT, EST, LEVL III, 20-29 MIN: ICD-10-PCS | Mod: S$PBB,,, | Performed by: FAMILY MEDICINE

## 2022-03-07 PROCEDURE — 80053 COMPREHEN METABOLIC PANEL: CPT | Mod: PN | Performed by: INTERNAL MEDICINE

## 2022-03-07 PROCEDURE — 84550 ASSAY OF BLOOD/URIC ACID: CPT | Mod: PN | Performed by: INTERNAL MEDICINE

## 2022-03-07 PROCEDURE — 99999 PR PBB SHADOW E&M-EST. PATIENT-LVL IV: CPT | Mod: PBBFAC,,, | Performed by: INTERNAL MEDICINE

## 2022-03-07 PROCEDURE — 36415 COLL VENOUS BLD VENIPUNCTURE: CPT | Mod: PN | Performed by: INTERNAL MEDICINE

## 2022-03-07 PROCEDURE — 99213 OFFICE O/P EST LOW 20 MIN: CPT | Mod: PBBFAC,27,PO | Performed by: FAMILY MEDICINE

## 2022-03-07 PROCEDURE — 85025 COMPLETE CBC W/AUTO DIFF WBC: CPT | Mod: PN | Performed by: INTERNAL MEDICINE

## 2022-03-07 PROCEDURE — 99214 OFFICE O/P EST MOD 30 MIN: CPT | Mod: PBBFAC,PN | Performed by: INTERNAL MEDICINE

## 2022-03-07 PROCEDURE — 99213 OFFICE O/P EST LOW 20 MIN: CPT | Mod: S$PBB,,, | Performed by: FAMILY MEDICINE

## 2022-03-07 PROCEDURE — 99999 PR PBB SHADOW E&M-EST. PATIENT-LVL III: ICD-10-PCS | Mod: PBBFAC,,, | Performed by: FAMILY MEDICINE

## 2022-03-07 PROCEDURE — 83615 LACTATE (LD) (LDH) ENZYME: CPT | Mod: PN | Performed by: INTERNAL MEDICINE

## 2022-03-07 PROCEDURE — 84466 ASSAY OF TRANSFERRIN: CPT | Performed by: INTERNAL MEDICINE

## 2022-03-07 PROCEDURE — 99212 OFFICE O/P EST SF 10 MIN: CPT | Mod: S$PBB,,, | Performed by: INTERNAL MEDICINE

## 2022-03-07 PROCEDURE — 99999 PR PBB SHADOW E&M-EST. PATIENT-LVL IV: ICD-10-PCS | Mod: PBBFAC,,, | Performed by: INTERNAL MEDICINE

## 2022-03-07 PROCEDURE — 99212 PR OFFICE/OUTPT VISIT, EST, LEVL II, 10-19 MIN: ICD-10-PCS | Mod: S$PBB,,, | Performed by: INTERNAL MEDICINE

## 2022-03-07 RX ORDER — METAXALONE 800 MG/1
TABLET ORAL
COMMUNITY
Start: 2022-02-17 | End: 2022-10-24

## 2022-03-07 RX ORDER — PREDNISOLONE 15 MG/5ML
40 SOLUTION ORAL DAILY
Qty: 480 ML | Refills: 0 | Status: SHIPPED | OUTPATIENT
Start: 2022-03-07 | End: 2022-10-24

## 2022-03-07 NOTE — LETTER
March 7, 2022      [facility]  [address]  [place] [address]  Phone: [phone]  Fax: [phone]       Patient: Augustine Collins   YOB: 1990  Date of Visit: 03/07/2022    To Whom It May Concern:    Jarad Collins  was at [facility] on 03/07/2022 to follow up on his DRESS Syndrome. The patient may return to work on 3/9/2022 with no restrictions. If you have any questions or concerns, or if I can be of further assistance, please do not hesitate to contact me.    Sincerely,    Zhang Zhou MD

## 2022-03-07 NOTE — PROGRESS NOTES
"Subjective:       Patient ID: Augustine Collins is a 31 y.o. male.    Chief Complaint: Follow-up    Here today to f/u on his DRESS syndrome.  His symptoms have fully resolved, except for a mild cough.  His mouth has healed, rash has resolved.  He is on a prolonged steroid taper until April 21st.  He has been using Prednisolone liquid and he is about to run out.  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    Review of Systems   Constitutional: Negative for appetite change, fatigue and fever.   HENT: Negative for congestion, sneezing and sore throat.    Respiratory: Negative for shortness of breath and wheezing.    Cardiovascular: Negative for chest pain and palpitations.   Gastrointestinal: Negative for abdominal pain, constipation, diarrhea, nausea and vomiting.   Genitourinary: Negative for difficulty urinating, dysuria, frequency and hematuria.   Musculoskeletal: Positive for neck pain (improving).   Neurological: Negative for dizziness, syncope, weakness and headaches.   Psychiatric/Behavioral: Negative for agitation, behavioral problems and confusion. The patient is not nervous/anxious.        Objective:      Vitals:    03/07/22 1117   BP: 122/80   Pulse: 87   SpO2: 98%   Weight: 77 kg (169 lb 12.1 oz)   Height: 5' 9" (1.753 m)      Physical Exam  Constitutional:       General: He is not in acute distress.  Cardiovascular:      Rate and Rhythm: Normal rate and regular rhythm.      Heart sounds: Normal heart sounds. No murmur heard.  Pulmonary:      Effort: Pulmonary effort is normal. No respiratory distress.      Breath sounds: Normal breath sounds. No wheezing, rhonchi or rales.   Skin:     General: Skin is warm and dry.   Neurological:      General: No focal deficit present.      Mental Status: He is alert.   Psychiatric:         Mood and Affect: Mood normal.         Behavior: Behavior normal.         Thought Content: Thought content normal.         Assessment:       1. DRESS syndrome        Plan:       DRESS " syndrome  -     prednisoLONE (PRELONE) 15 mg/5 mL syrup; Take 13.3 mLs (39.9 mg total) by mouth once daily. And continue taper as directed  Dispense: 480 mL; Refill: 0    Refill Prednisolone today for him to continue to taper off.  Otherwise, he is free to return to work today.      Medication List with Changes/Refills   Current Medications    FAMOTIDINE (PEPCID) 20 MG TABLET    Take 20 mg by mouth 2 (two) times daily.    METAXALONE (SKELAXIN) 800 MG TABLET        NAPROXEN (NAPROSYN) 500 MG TABLET    Take 1 tablet (500 mg total) by mouth 2 (two) times daily.    VITAMIN D (VITAMIN D3) 1000 UNITS TAB    Take 1,000 Units by mouth once daily.   Changed and/or Refilled Medications    Modified Medication Previous Medication    PREDNISOLONE (PRELONE) 15 MG/5 ML SYRUP prednisoLONE (PRELONE) 15 mg/5 mL syrup       Take 13.3 mLs (39.9 mg total) by mouth once daily. And continue taper as directed    Take 20 mLs (60 mg total) by mouth once daily.   Discontinued Medications    WATER LIQD 150 ML WITH MILK OF MAGNESIA 400 MG/5 ML SUSP 400 MG, DIPHENHYDRAMINE 12.5 MG/5 ML ELIX 60 MG, NYSTATIN 100,000 UNIT/ML SUSP 500,000 UNITS    SHAKE WELL. REFRIDGERATE. SWISH AND SPIT 5 ML EVERY 4 TO 6 HOURS OR PRIOR TO MEALS
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
## 2022-03-08 ENCOUNTER — TELEPHONE (OUTPATIENT)
Dept: HEMATOLOGY/ONCOLOGY | Facility: CLINIC | Age: 32
End: 2022-03-08
Payer: OTHER GOVERNMENT

## 2022-03-08 NOTE — TELEPHONE ENCOUNTER
I called the patient and let him know his ferritin was normal at 257ng/mL.  The patient expressed understanding.  All questions were answered to his satisfaction.    James Huertas MD  [facility]  Hematology and Oncology  [facility]   [address]   [place] [address]   O: [phone]  F: [phone]

## 2022-03-25 ENCOUNTER — OFFICE VISIT (OUTPATIENT)
Dept: FAMILY MEDICINE | Facility: CLINIC | Age: 32
End: 2022-03-25
Payer: OTHER GOVERNMENT

## 2022-03-25 VITALS
TEMPERATURE: 98 F | HEIGHT: 69 IN | BODY MASS INDEX: 26.55 KG/M2 | DIASTOLIC BLOOD PRESSURE: 88 MMHG | OXYGEN SATURATION: 96 % | HEART RATE: 91 BPM | SYSTOLIC BLOOD PRESSURE: 130 MMHG | WEIGHT: 179.25 LBS

## 2022-03-25 DIAGNOSIS — R05.3 CHRONIC COUGH: Primary | ICD-10-CM

## 2022-03-25 PROCEDURE — 99214 OFFICE O/P EST MOD 30 MIN: CPT | Mod: S$PBB,,, | Performed by: FAMILY MEDICINE

## 2022-03-25 PROCEDURE — 99214 PR OFFICE/OUTPT VISIT, EST, LEVL IV, 30-39 MIN: ICD-10-PCS | Mod: S$PBB,,, | Performed by: FAMILY MEDICINE

## 2022-03-25 PROCEDURE — 99999 PR PBB SHADOW E&M-EST. PATIENT-LVL III: ICD-10-PCS | Mod: PBBFAC,,, | Performed by: FAMILY MEDICINE

## 2022-03-25 PROCEDURE — 99213 OFFICE O/P EST LOW 20 MIN: CPT | Mod: PBBFAC,PO | Performed by: FAMILY MEDICINE

## 2022-03-25 PROCEDURE — 99999 PR PBB SHADOW E&M-EST. PATIENT-LVL III: CPT | Mod: PBBFAC,,, | Performed by: FAMILY MEDICINE

## 2022-03-25 RX ORDER — FLUTICASONE PROPIONATE AND SALMETEROL 250; 50 UG/1; UG/1
1 POWDER RESPIRATORY (INHALATION) 2 TIMES DAILY
Qty: 60 EACH | Refills: 11 | Status: SHIPPED | OUTPATIENT
Start: 2022-03-25 | End: 2022-10-24

## 2022-03-25 RX ORDER — PANTOPRAZOLE SODIUM 40 MG/1
40 TABLET, DELAYED RELEASE ORAL DAILY
Qty: 30 TABLET | Refills: 3 | Status: SHIPPED | OUTPATIENT
Start: 2022-03-25 | End: 2022-10-24

## 2022-03-25 NOTE — PROGRESS NOTES
"Subjective:       Patient ID: Augustine Collins is a 32 y.o. male.    Chief Complaint: Cough    Here today c/o cough.  He was dx with pneumonia in Jan and then DRESS syndrome.  He has been dealing with a lingering cough since that time.  He had a CXR on 2/8 that was normal.  He conitnues to be on steroids for the DRESS syndrome.  He denies any other symptoms.  No fevers, no SOB, no wheezing.      Review of Systems   Constitutional: Negative for appetite change, fatigue and fever.   HENT: Negative for congestion, sneezing and sore throat.    Respiratory: Negative for shortness of breath and wheezing.    Cardiovascular: Negative for chest pain and palpitations.   Gastrointestinal: Negative for abdominal pain, constipation, diarrhea, nausea and vomiting.   Genitourinary: Negative for difficulty urinating, dysuria, frequency and hematuria.   Neurological: Negative for dizziness, syncope, weakness and headaches.   Psychiatric/Behavioral: Negative for agitation, behavioral problems and confusion.       Objective:      Vitals:    03/25/22 1134   BP: 130/88   BP Location: Right arm   Patient Position: Sitting   Pulse: 91   Temp: 97.8 °F (36.6 °C)   TempSrc: Oral   SpO2: 96%   Weight: 81.3 kg (179 lb 3.7 oz)   Height: 5' 9" (1.753 m)      Physical Exam    Assessment:       1. Chronic cough        Plan:       Chronic cough  -     fluticasone-salmeterol diskus inhaler 250-50 mcg; Inhale 1 puff into the lungs 2 (two) times daily. Controller  Dispense: 60 each; Refill: 11  -     pantoprazole (PROTONIX) 40 MG tablet; Take 1 tablet (40 mg total) by mouth once daily.  Dispense: 30 tablet; Refill: 3    Will trial inhaled steroids and PPI.  Discussed f/u with ENT if no improvement.       Medication List with Changes/Refills   New Medications    FLUTICASONE-SALMETEROL DISKUS INHALER 250-50 MCG    Inhale 1 puff into the lungs 2 (two) times daily. Controller    PANTOPRAZOLE (PROTONIX) 40 MG TABLET    Take 1 tablet (40 mg total) by mouth " once daily.   Current Medications    METAXALONE (SKELAXIN) 800 MG TABLET        PREDNISOLONE (PRELONE) 15 MG/5 ML SYRUP    Take 13.3 mLs (39.9 mg total) by mouth once daily. And continue taper as directed    VITAMIN D (VITAMIN D3) 1000 UNITS TAB    Take 1,000 Units by mouth once daily.   Discontinued Medications    FAMOTIDINE (PEPCID) 20 MG TABLET    Take 20 mg by mouth 2 (two) times daily.

## 2022-04-06 ENCOUNTER — DOCUMENTATION ONLY (OUTPATIENT)
Dept: FAMILY MEDICINE | Facility: CLINIC | Age: 32
End: 2022-04-06

## 2022-04-06 NOTE — PROGRESS NOTES
PA initiated in CMM for Fluticasone-Salmeterol 250-50mcg  KEy:[id number]  Case: Rx #: [id number]  Lily

## 2022-10-24 ENCOUNTER — OFFICE VISIT (OUTPATIENT)
Dept: FAMILY MEDICINE | Facility: CLINIC | Age: 32
End: 2022-10-24
Payer: OTHER GOVERNMENT

## 2022-10-24 ENCOUNTER — HOSPITAL ENCOUNTER (OUTPATIENT)
Dept: RADIOLOGY | Facility: HOSPITAL | Age: 32
Discharge: HOME | End: 2022-10-24
Attending: FAMILY MEDICINE
Payer: OTHER GOVERNMENT

## 2022-10-24 VITALS
SYSTOLIC BLOOD PRESSURE: 120 MMHG | BODY MASS INDEX: 28.8 KG/M2 | HEIGHT: 69 IN | WEIGHT: 194.44 LBS | DIASTOLIC BLOOD PRESSURE: 88 MMHG | HEART RATE: 66 BPM | OXYGEN SATURATION: 97 %

## 2022-10-24 DIAGNOSIS — R82.90 URINE MALODOR: ICD-10-CM

## 2022-10-24 DIAGNOSIS — R05.3 CHRONIC COUGH: ICD-10-CM

## 2022-10-24 DIAGNOSIS — T50.905A DRESS SYNDROME: Primary | ICD-10-CM

## 2022-10-24 DIAGNOSIS — D72.12 DRESS SYNDROME: Primary | ICD-10-CM

## 2022-10-24 PROCEDURE — 99214 OFFICE O/P EST MOD 30 MIN: CPT | Mod: S$PBB,,, | Performed by: FAMILY MEDICINE

## 2022-10-24 PROCEDURE — 99999 PR PBB SHADOW E&M-EST. PATIENT-LVL IV: ICD-10-PCS | Mod: PBBFAC,,, | Performed by: FAMILY MEDICINE

## 2022-10-24 PROCEDURE — 71046 X-RAY EXAM CHEST 2 VIEWS: CPT | Mod: 26,,, | Performed by: RADIOLOGY

## 2022-10-24 PROCEDURE — 71046 X-RAY EXAM CHEST 2 VIEWS: CPT | Mod: TC,FY,PO

## 2022-10-24 PROCEDURE — 71046 XR CHEST PA AND LATERAL: ICD-10-PCS | Mod: 26,,, | Performed by: RADIOLOGY

## 2022-10-24 PROCEDURE — 99999 PR PBB SHADOW E&M-EST. PATIENT-LVL IV: CPT | Mod: PBBFAC,,, | Performed by: FAMILY MEDICINE

## 2022-10-24 PROCEDURE — 99214 OFFICE O/P EST MOD 30 MIN: CPT | Mod: PBBFAC,PO,25 | Performed by: FAMILY MEDICINE

## 2022-10-24 PROCEDURE — 99214 PR OFFICE/OUTPT VISIT, EST, LEVL IV, 30-39 MIN: ICD-10-PCS | Mod: S$PBB,,, | Performed by: FAMILY MEDICINE

## 2022-10-24 NOTE — PROGRESS NOTES
"Subjective:       Patient ID: Auugstine Collins is a 32 y.o. male.    Chief Complaint: Cough (Ongoing since February ) and urine (Odor )    Here today c/o cough.  He was dx with pneumonia in Jan and then DRESS syndrome.  He has been dealing with a lingering cough since that time.  He had a CXR on 2/8 that was normal.  Cough is mostly dry.  Denies SOB or wheezing.  He states that his urine has been smelling stronger and his body odor is off since he was in the hospital.    Review of Systems   Constitutional:  Negative for appetite change, fatigue and fever.   Respiratory:  Positive for cough. Negative for shortness of breath and wheezing.    Cardiovascular:  Negative for chest pain and palpitations.   Gastrointestinal:  Negative for abdominal pain, constipation, diarrhea, nausea and vomiting.   Genitourinary:  Negative for difficulty urinating, dysuria, frequency and hematuria.   Neurological:  Negative for dizziness, syncope, weakness and headaches.   Psychiatric/Behavioral:  Negative for agitation, behavioral problems and confusion. The patient is not nervous/anxious.      Objective:      Vitals:    10/24/22 1312   BP: 120/88   Pulse: 66   SpO2: 97%   Weight: 88.2 kg (194 lb 7.1 oz)   Height: 5' 9" (1.753 m)      Physical Exam  Constitutional:       General: He is not in acute distress.  Cardiovascular:      Rate and Rhythm: Normal rate and regular rhythm.      Heart sounds: Normal heart sounds. No murmur heard.  Pulmonary:      Effort: Pulmonary effort is normal. No respiratory distress.      Breath sounds: Normal breath sounds. No wheezing, rhonchi or rales.   Skin:     General: Skin is warm and dry.   Neurological:      General: No focal deficit present.      Mental Status: He is alert.   Psychiatric:         Mood and Affect: Mood normal.         Behavior: Behavior normal.         Thought Content: Thought content normal.       Results for orders placed or performed in visit on 03/07/22   CBC Auto Differential "   Result Value Ref Range    WBC 10.93 3.90 - 12.70 K/uL    RBC 4.63 4.60 - 6.20 M/uL    Hemoglobin 13.6 (L) 14.0 - 18.0 g/dL    Hematocrit 40.1 40.0 - 54.0 %    MCV 87 82 - 98 fL    MCH 29.4 27.0 - 31.0 pg    MCHC 33.9 32.0 - 36.0 g/dL    RDW 13.4 11.5 - 14.5 %    Platelets 286 150 - 450 K/uL    MPV 8.7 (L) 9.2 - 12.9 fL    Immature Granulocytes 0.7 (H) 0.0 - 0.5 %    Gran # (ANC) 5.9 1.8 - 7.7 K/uL    Immature Grans (Abs) 0.08 (H) 0.00 - 0.04 K/uL    Lymph # 4.0 1.0 - 4.8 K/uL    Mono # 0.9 0.3 - 1.0 K/uL    Eos # 0.1 0.0 - 0.5 K/uL    Baso # 0.06 0.00 - 0.20 K/uL    nRBC 0 0 /100 WBC    Gran % 53.7 38.0 - 73.0 %    Lymph % 36.1 18.0 - 48.0 %    Mono % 7.9 4.0 - 15.0 %    Eosinophil % 1.1 0.0 - 8.0 %    Basophil % 0.5 0.0 - 1.9 %    Differential Method Automated    Comprehensive Metabolic Panel   Result Value Ref Range    Sodium 143 136 - 145 mmol/L    Potassium 4.2 3.5 - 5.1 mmol/L    Chloride 103 95 - 110 mmol/L    CO2 28 23 - 29 mmol/L    Glucose 103 70 - 110 mg/dL    BUN 21 (H) 6 - 20 mg/dL    Creatinine 1.0 0.5 - 1.4 mg/dL    Calcium 9.9 8.7 - 10.5 mg/dL    Total Protein 7.4 6.0 - 8.4 g/dL    Albumin 4.1 3.5 - 5.2 g/dL    Total Bilirubin 0.6 0.1 - 1.0 mg/dL    Alkaline Phosphatase 48 (L) 55 - 135 U/L    AST 11 10 - 40 U/L    ALT 18 10 - 44 U/L    Anion Gap 12 8 - 16 mmol/L    eGFR if African American >60 >60 mL/min/1.73 m^2    eGFR if non African American >60 >60 mL/min/1.73 m^2   Ferritin   Result Value Ref Range    Ferritin 257 20.0 - 300.0 ng/mL   Iron and TIBC   Result Value Ref Range    Iron 99 45 - 160 ug/dL    Transferrin 268 200 - 375 mg/dL    TIBC 397 250 - 450 ug/dL    Saturated Iron 25 20 - 50 %   Lactate Dehydrogenase   Result Value Ref Range     110 - 260 U/L   Uric Acid   Result Value Ref Range    Uric Acid 6.2 3.4 - 7.0 mg/dL      Assessment:       1. DRESS syndrome    2. Urine malodor    3. Chronic cough        Plan:       DRESS syndrome  -     Comprehensive Metabolic Panel; Future;  Expected date: 10/24/2022  -     CBC Auto Differential; Future; Expected date: 10/24/2022    Urine malodor  -     Urinalysis, Reflex to Urine Culture Urine, Clean Catch; Future; Expected date: 10/24/2022    Chronic cough  -     X-Ray Chest PA And Lateral; Future; Expected date: 10/24/2022  -     Ambulatory referral/consult to ENT; Future; Expected date: 10/31/2022  -     Pulmonary function test; Future      Recheck Labs (has been > 6 months) with urine today.  Recheck CXR and will do PFT.  Referral to ENT for possible scope.  Medication List with Changes/Refills   Discontinued Medications    FLUTICASONE-SALMETEROL DISKUS INHALER 250-50 MCG    Inhale 1 puff into the lungs 2 (two) times daily. Controller    METAXALONE (SKELAXIN) 800 MG TABLET        PANTOPRAZOLE (PROTONIX) 40 MG TABLET    Take 1 tablet (40 mg total) by mouth once daily.    PREDNISOLONE (PRELONE) 15 MG/5 ML SYRUP    Take 13.3 mLs (39.9 mg total) by mouth once daily. And continue taper as directed    VITAMIN D (VITAMIN D3) 1000 UNITS TAB    Take 1,000 Units by mouth once daily.

## 2024-07-09 ENCOUNTER — PATIENT MESSAGE (OUTPATIENT)
Dept: ADMINISTRATIVE | Facility: HOSPITAL | Age: 34
End: 2024-07-09
Payer: OTHER GOVERNMENT

## 2025-08-19 ENCOUNTER — PATIENT MESSAGE (OUTPATIENT)
Dept: ADMINISTRATIVE | Facility: HOSPITAL | Age: 35
End: 2025-08-19